# Patient Record
Sex: MALE | Race: WHITE | Employment: STUDENT | URBAN - METROPOLITAN AREA
[De-identification: names, ages, dates, MRNs, and addresses within clinical notes are randomized per-mention and may not be internally consistent; named-entity substitution may affect disease eponyms.]

---
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
## 2020-01-09 ENCOUNTER — HOSPITAL ENCOUNTER (OUTPATIENT)
Age: 20
Discharge: HOME | End: 2020-01-09
Attending: EMERGENCY MEDICINE
Payer: COMMERCIAL

## 2020-01-09 VITALS
RESPIRATION RATE: 16 BRPM | HEART RATE: 55 BPM | TEMPERATURE: 99 F | OXYGEN SATURATION: 98 % | DIASTOLIC BLOOD PRESSURE: 69 MMHG | SYSTOLIC BLOOD PRESSURE: 132 MMHG

## 2020-01-09 DIAGNOSIS — J01.90 ACUTE SINUSITIS, RECURRENCE NOT SPECIFIED, UNSPECIFIED LOCATION: ICD-10-CM

## 2020-01-09 DIAGNOSIS — J02.9 PHARYNGITIS, UNSPECIFIED ETIOLOGY: Primary | ICD-10-CM

## 2020-01-09 LAB — S PYO AG THROAT QL: NEGATIVE

## 2020-01-09 PROCEDURE — 99214 OFFICE O/P EST MOD 30 MIN: CPT

## 2020-01-09 PROCEDURE — 87430 STREP A AG IA: CPT

## 2020-01-09 PROCEDURE — 99213 OFFICE O/P EST LOW 20 MIN: CPT

## 2020-01-09 RX ORDER — AMOXICILLIN AND CLAVULANATE POTASSIUM 875; 125 MG/1; MG/1
1 TABLET, FILM COATED ORAL 2 TIMES DAILY
Qty: 20 TABLET | Refills: 0 | Status: SHIPPED | OUTPATIENT
Start: 2020-01-09 | End: 2020-01-19

## 2020-01-09 NOTE — ED INITIAL ASSESSMENT (HPI)
Reports sore throat, sinus pain and congestion for the past few days. Denies fevers. Recent strep throat exposure.

## 2020-01-09 NOTE — ED PROVIDER NOTES
Patient Seen in: [facility]      History   Patient presents with:  Sore Throat    Stated Complaint: SORE THROAT SINUS     HPI    Pt presents to the [facility] with sore throat and sinus congestion for 3 days.  Younger sibling dx effort is normal. No respiratory distress. Breath sounds: Normal breath sounds. Abdominal:      General: There is no distension. Palpations: Abdomen is soft. Tenderness: There is no tenderness. Musculoskeletal: Normal range of motion.

## 2020-07-01 ENCOUNTER — OFFICE VISIT (OUTPATIENT)
Dept: INTERNAL MEDICINE CLINIC | Facility: CLINIC | Age: 20
End: 2020-07-01
Payer: COMMERCIAL

## 2020-07-01 VITALS
HEART RATE: 48 BPM | HEIGHT: 71 IN | SYSTOLIC BLOOD PRESSURE: 110 MMHG | OXYGEN SATURATION: 99 % | BODY MASS INDEX: 27.97 KG/M2 | TEMPERATURE: 98 F | DIASTOLIC BLOOD PRESSURE: 78 MMHG | WEIGHT: 199.81 LBS

## 2020-07-01 DIAGNOSIS — Z02.5 SPORTS PHYSICAL: Primary | ICD-10-CM

## 2020-07-01 DIAGNOSIS — R00.1 SINUS BRADYCARDIA: ICD-10-CM

## 2020-07-01 PROCEDURE — 99385 PREV VISIT NEW AGE 18-39: CPT | Performed by: INTERNAL MEDICINE

## 2020-07-01 NOTE — PROGRESS NOTES
Irena Rosa is a 23year old male.     Chief complaint: school physical     HPI:     23year old male with PMH as listed below here for   School physical     No chest pain no sob no abdominal pain  No diarrhea or constipation   No fever or chills   No ur clear  HERMINIO, EOMI   NECK: supple,no adenopathy  LUNGS: clear to auscultation  CARDIO: RRR without murmur  GI: good BS's,no masses, HSM or tenderness  EXTREMITIES: no cyanosis, clubbing or edema  Genitalia : normal no hernias     NEURO: no gross deficits

## 2020-07-03 ENCOUNTER — HOSPITAL ENCOUNTER (OUTPATIENT)
Dept: CV DIAGNOSTICS | Age: 20
Discharge: HOME | End: 2020-07-03
Attending: INTERNAL MEDICINE
Payer: COMMERCIAL

## 2020-07-03 DIAGNOSIS — Z02.5 SPORTS PHYSICAL: ICD-10-CM

## 2020-07-03 DIAGNOSIS — R00.1 SINUS BRADYCARDIA: ICD-10-CM

## 2020-07-03 PROCEDURE — 93306 TTE W/DOPPLER COMPLETE: CPT | Performed by: INTERNAL MEDICINE

## 2020-07-08 ENCOUNTER — NURSE ONLY (OUTPATIENT)
Dept: INTERNAL MEDICINE CLINIC | Facility: CLINIC | Age: 20
End: 2020-07-08
Payer: COMMERCIAL

## 2020-07-08 DIAGNOSIS — R74.8 ELEVATED LIVER ENZYMES: Primary | ICD-10-CM

## 2020-07-08 DIAGNOSIS — R00.1 SINUS BRADYCARDIA: ICD-10-CM

## 2020-07-08 DIAGNOSIS — Z02.5 SPORTS PHYSICAL: ICD-10-CM

## 2020-07-08 LAB
ALBUMIN SERPL-MCNC: 3.9 G/DL (ref 3.4–5)
ALBUMIN/GLOB SERPL: 1.2 {RATIO} (ref 1–2)
ALP LIVER SERPL-CCNC: 78 U/L (ref 45–117)
ALT SERPL-CCNC: 58 U/L (ref 16–61)
ANION GAP SERPL CALC-SCNC: 7 MMOL/L (ref 0–18)
AST SERPL-CCNC: 70 U/L (ref 15–37)
BASOPHILS # BLD AUTO: 0.02 X10(3) UL (ref 0–0.2)
BASOPHILS NFR BLD AUTO: 0.5 %
BILIRUB SERPL-MCNC: 1.1 MG/DL (ref 0.1–2)
BUN BLD-MCNC: 11 MG/DL (ref 7–18)
BUN/CREAT SERPL: 10.4 (ref 10–20)
CALCIUM BLD-MCNC: 8.6 MG/DL (ref 8.5–10.1)
CHLORIDE SERPL-SCNC: 109 MMOL/L (ref 98–112)
CHOLEST SMN-MCNC: 123 MG/DL (ref ?–200)
CO2 SERPL-SCNC: 24 MMOL/L (ref 21–32)
CREAT BLD-MCNC: 1.06 MG/DL (ref 0.7–1.3)
DEPRECATED RDW RBC AUTO: 37 FL (ref 35.1–46.3)
EOSINOPHIL # BLD AUTO: 0.06 X10(3) UL (ref 0–0.7)
EOSINOPHIL NFR BLD AUTO: 1.4 %
ERYTHROCYTE [DISTWIDTH] IN BLOOD BY AUTOMATED COUNT: 11.7 % (ref 11–15)
GLOBULIN PLAS-MCNC: 3.3 G/DL (ref 2.8–4.4)
GLUCOSE BLD-MCNC: 76 MG/DL (ref 70–99)
HCT VFR BLD AUTO: 44.5 % (ref 39–53)
HDLC SERPL-MCNC: 45 MG/DL (ref 40–59)
HGB BLD-MCNC: 16.3 G/DL (ref 13–17.5)
IMM GRANULOCYTES # BLD AUTO: 0.01 X10(3) UL (ref 0–1)
IMM GRANULOCYTES NFR BLD: 0.2 %
LDLC SERPL CALC-MCNC: 69 MG/DL (ref ?–100)
LYMPHOCYTES # BLD AUTO: 1.53 X10(3) UL (ref 1.5–5)
LYMPHOCYTES NFR BLD AUTO: 34.5 %
M PROTEIN MFR SERPL ELPH: 7.2 G/DL (ref 6.4–8.2)
MCH RBC QN AUTO: 31.8 PG (ref 26–34)
MCHC RBC AUTO-ENTMCNC: 36.6 G/DL (ref 31–37)
MCV RBC AUTO: 86.7 FL (ref 80–100)
MONOCYTES # BLD AUTO: 0.55 X10(3) UL (ref 0.1–1)
MONOCYTES NFR BLD AUTO: 12.4 %
NEUTROPHILS # BLD AUTO: 2.27 X10 (3) UL (ref 1.5–7.7)
NEUTROPHILS # BLD AUTO: 2.27 X10(3) UL (ref 1.5–7.7)
NEUTROPHILS NFR BLD AUTO: 51 %
NONHDLC SERPL-MCNC: 78 MG/DL (ref ?–130)
OSMOLALITY SERPL CALC.SUM OF ELEC: 288 MOSM/KG (ref 275–295)
PATIENT FASTING Y/N/NP: YES
PATIENT FASTING Y/N/NP: YES
PLATELET # BLD AUTO: 174 10(3)UL (ref 150–450)
POTASSIUM SERPL-SCNC: 3.9 MMOL/L (ref 3.5–5.1)
RBC # BLD AUTO: 5.13 X10(6)UL (ref 4.3–5.7)
SODIUM SERPL-SCNC: 140 MMOL/L (ref 136–145)
TRIGL SERPL-MCNC: 47 MG/DL (ref 30–149)
TSI SER-ACNC: 1.31 MIU/ML (ref 0.36–3.74)
VLDLC SERPL CALC-MCNC: 9 MG/DL (ref 0–30)
WBC # BLD AUTO: 4.4 X10(3) UL (ref 4–11)

## 2020-07-08 PROCEDURE — 80061 LIPID PANEL: CPT | Performed by: INTERNAL MEDICINE

## 2020-07-08 PROCEDURE — 36415 COLL VENOUS BLD VENIPUNCTURE: CPT | Performed by: INTERNAL MEDICINE

## 2020-07-08 PROCEDURE — 80050 GENERAL HEALTH PANEL: CPT | Performed by: INTERNAL MEDICINE

## 2020-07-08 NOTE — PROGRESS NOTES
Patient came in today to get labs done, per Dr Laryr Gregory have patient get CBC,CMP, LIPIDS AND TSH. Blood was drawn from his R antecubital with no complications and specimen sent to the lab.

## 2020-08-09 ENCOUNTER — HOSPITAL ENCOUNTER (EMERGENCY)
Facility: HOSPITAL | Age: 20
Discharge: HOME | End: 2020-08-09
Payer: COMMERCIAL

## 2020-08-09 VITALS
HEIGHT: 72 IN | BODY MASS INDEX: 26.28 KG/M2 | HEART RATE: 70 BPM | DIASTOLIC BLOOD PRESSURE: 62 MMHG | RESPIRATION RATE: 18 BRPM | TEMPERATURE: 99 F | OXYGEN SATURATION: 97 % | SYSTOLIC BLOOD PRESSURE: 117 MMHG | WEIGHT: 194 LBS

## 2020-08-09 DIAGNOSIS — B34.9 VIRAL ILLNESS: Primary | ICD-10-CM

## 2020-08-09 LAB — S PYO AG THROAT QL: NEGATIVE

## 2020-08-09 PROCEDURE — 87430 STREP A AG IA: CPT

## 2020-08-09 PROCEDURE — 99283 EMERGENCY DEPT VISIT LOW MDM: CPT

## 2020-08-09 NOTE — ED NOTES
Patient reports cough, sore throat, fever and fatigue for 2 days. No known sick contacts. States he does not work out of the home, has been social distancing and wearing a mask as much as possible. No sick family members at home. Dad at the bedside.     Garth Nice

## 2020-08-09 NOTE — ED INITIAL ASSESSMENT (HPI)
Sore throat, cough, fever (tmax 101.8) and fatigue x 2 days. No known covid contacts.      Wants testing

## 2020-08-09 NOTE — TELEPHONE ENCOUNTER
Mother called stated that Tania Burr had sudden onset of high fever of 101.5 F. Body, ache, frequent cough, very weak, poor appetite. P : High suspicion  for Covid. Will go to [facility] ED now.

## 2020-08-09 NOTE — ED PROVIDER NOTES
Patient Seen in: [facility] AND [facility] Emergency Department      History   No chief complaint on file. Stated Complaint: Testing    HPI    66-year-old male presents to the emergency department with sore throat cough and low-grade fever times last 2 days. Musculoskeletal:         General: No tenderness. Skin:     General: Skin is warm and dry. Capillary Refill: Capillary refill takes less than 2 seconds. Findings: No rash.    Neurological:      Mental Status: He is alert and oriented to person,

## 2020-08-15 LAB — SARS-COV-2 BY PCR: DETECTED

## 2021-09-07 ENCOUNTER — HOSPITAL ENCOUNTER (OUTPATIENT)
Age: 21
Discharge: HOME | End: 2021-09-07
Payer: COMMERCIAL

## 2021-09-07 VITALS
SYSTOLIC BLOOD PRESSURE: 125 MMHG | BODY MASS INDEX: 27 KG/M2 | HEART RATE: 74 BPM | TEMPERATURE: 98 F | DIASTOLIC BLOOD PRESSURE: 82 MMHG | RESPIRATION RATE: 20 BRPM | WEIGHT: 196.38 LBS | OXYGEN SATURATION: 97 %

## 2021-09-07 DIAGNOSIS — J02.0 STREPTOCOCCAL SORE THROAT: Primary | ICD-10-CM

## 2021-09-07 LAB — S PYO AG THROAT QL: POSITIVE

## 2021-09-07 PROCEDURE — 87880 STREP A ASSAY W/OPTIC: CPT

## 2021-09-07 PROCEDURE — 99213 OFFICE O/P EST LOW 20 MIN: CPT

## 2021-09-07 RX ORDER — AMOXICILLIN 875 MG/1
875 TABLET, COATED ORAL 2 TIMES DAILY
Qty: 20 TABLET | Refills: 0 | Status: SHIPPED | OUTPATIENT
Start: 2021-09-07 | End: 2021-09-17

## 2021-09-07 NOTE — ED PROVIDER NOTES
Patient presents with:  Sore Throat      HPI:     Chrystal Mejias is a 21year old male who presents for a sore throat and tactile fever for the past 2 to 3 days. His siblings have the same symptoms. No difficulty swallowing. Speech is clear.   No difficu About Running Out of Food in the Last Year:       Ran Out of Food in the Last Year:   Transportation Needs:       Lack of Transportation (Medical):       Lack of Transportation (Non-Medical):   Physical Activity:       Days of Exercise per Week:       Mary Strep    Collection Time: 09/07/21 10:05 AM   Result Value Ref Range    POCT Rapid Strep Positive (A) Negative       MDM:  The rapid strep test is positive. I will treat with amoxicillin. He declines Covid testing.   He will follow-up as needed with his p

## 2021-09-28 NOTE — PROGRESS NOTES
FAMILY MEDICINE CLINIC NOTE    HPI  Russ Caal is a 21year old male presenting for physical    #Health Maintenance  -Diet: Good - fruits, vegetables, watches what he eats  -Exercise: Basketball regularly - plays for COD  -Lung cancer screen: Not indic status: Single      Spouse name: Not on file      Number of children: Not on file      Years of education: Not on file      Highest education level: Not on file    Occupational History      Not on file    Tobacco Use      Smoking status: Never Smoker Services: Not on file      Active Member of Clubs or Organizations: Not on file      Attends Club or Organization Meetings: Not on file      Marital Status: Not on file  Intimate Partner Violence:       Fear of Current or Ex-Partner: Not on file      Emoti range of motion of bilateral upper and lower extremities. 5 5 strength flexion extension of the bilateral upper and lower extremities. Normal duck walk, 1 foot hop.   SKIN: Warm and dry, no rashes  NEURO: Answering questions appropriately    LABS  Lab Res from a heart attack in his older age. His bradycardia could be related to his athletic build. We will continue to monitor this bradycardia over time. Other    Health maintenance examination - Primary     Exercise and diet advised.   No blood w

## 2021-09-29 ENCOUNTER — OFFICE VISIT (OUTPATIENT)
Dept: INTERNAL MEDICINE CLINIC | Facility: CLINIC | Age: 21
End: 2021-09-29
Payer: COMMERCIAL

## 2021-09-29 VITALS
BODY MASS INDEX: 26.9 KG/M2 | SYSTOLIC BLOOD PRESSURE: 110 MMHG | WEIGHT: 198.63 LBS | OXYGEN SATURATION: 99 % | HEART RATE: 42 BPM | DIASTOLIC BLOOD PRESSURE: 70 MMHG | HEIGHT: 72 IN

## 2021-09-29 DIAGNOSIS — R74.8 ELEVATED LIVER ENZYMES: ICD-10-CM

## 2021-09-29 DIAGNOSIS — R00.1 BRADYCARDIA: ICD-10-CM

## 2021-09-29 DIAGNOSIS — Z00.00 HEALTH MAINTENANCE EXAMINATION: Primary | ICD-10-CM

## 2021-09-29 PROBLEM — S93.411A SPRAIN OF CALCANEOFIBULAR LIGAMENT OF RIGHT ANKLE: Status: RESOLVED | Noted: 2017-04-24 | Resolved: 2021-09-29

## 2021-09-29 PROBLEM — J03.91 RECURRENT ACUTE TONSILLITIS: Status: RESOLVED | Noted: 2019-04-08 | Resolved: 2021-09-29

## 2021-09-29 PROBLEM — S93.402A SPRAIN OF LEFT ANKLE: Status: RESOLVED | Noted: 2018-03-08 | Resolved: 2021-09-29

## 2021-09-29 PROBLEM — J03.91 RECURRENT ACUTE TONSILLITIS: Status: ACTIVE | Noted: 2019-04-08

## 2021-09-29 PROCEDURE — 3078F DIAST BP <80 MM HG: CPT | Performed by: FAMILY MEDICINE

## 2021-09-29 PROCEDURE — 3008F BODY MASS INDEX DOCD: CPT | Performed by: FAMILY MEDICINE

## 2021-09-29 PROCEDURE — 99395 PREV VISIT EST AGE 18-39: CPT | Performed by: FAMILY MEDICINE

## 2021-09-29 PROCEDURE — 96127 BRIEF EMOTIONAL/BEHAV ASSMT: CPT | Performed by: FAMILY MEDICINE

## 2021-09-29 PROCEDURE — 3074F SYST BP LT 130 MM HG: CPT | Performed by: FAMILY MEDICINE

## 2021-09-29 NOTE — PATIENT INSTRUCTIONS
• Thank you for seeing me today, it was a pleasure taking care of you. • Please check out at the  and schedule a follow up appointment. Return in about 1 year (around 9/29/2022). • Please make a lab appointment to get your blood drawn.   • The

## 2021-09-29 NOTE — ASSESSMENT & PLAN NOTE
Exercise and diet advised. No blood work needed today–recently had blood work 1 year ago. In the next blood draw, would recommend HIV screen and hepatitis C screen. Flu shot declined  HPV vaccine declined  Advanced directive information provided.   Kathie Thornton

## 2021-09-29 NOTE — ASSESSMENT & PLAN NOTE
Chart review patient does have mildly elevated liver enzymes in the past.  Can continue to monitor with future blood work

## 2021-09-29 NOTE — ASSESSMENT & PLAN NOTE
Patient with significant bradycardia, here for sports physical.  He has been evaluated for his bradycardia in the past.  He has had an EKG last year which showed sinus bradycardia in addition to an echo completed which showed wall thickness of the left jacque

## 2022-04-13 ENCOUNTER — APPOINTMENT (OUTPATIENT)
Dept: GENERAL RADIOLOGY | Age: 22
End: 2022-04-13
Attending: EMERGENCY MEDICINE
Payer: COMMERCIAL

## 2022-04-13 ENCOUNTER — HOSPITAL ENCOUNTER (OUTPATIENT)
Age: 22
Discharge: HOME | End: 2022-04-13
Payer: COMMERCIAL

## 2022-04-13 VITALS
TEMPERATURE: 98 F | OXYGEN SATURATION: 97 % | HEART RATE: 57 BPM | DIASTOLIC BLOOD PRESSURE: 84 MMHG | RESPIRATION RATE: 18 BRPM | SYSTOLIC BLOOD PRESSURE: 142 MMHG

## 2022-04-13 DIAGNOSIS — S63.641A SPRAIN OF METACARPOPHALANGEAL (MCP) JOINT OF RIGHT THUMB, INITIAL ENCOUNTER: Primary | ICD-10-CM

## 2022-04-13 PROCEDURE — 73140 X-RAY EXAM OF FINGER(S): CPT | Performed by: EMERGENCY MEDICINE

## 2022-04-13 PROCEDURE — 99213 OFFICE O/P EST LOW 20 MIN: CPT

## 2022-04-13 RX ORDER — IBUPROFEN 600 MG/1
600 TABLET ORAL EVERY 6 HOURS PRN
Qty: 20 TABLET | Refills: 0 | Status: SHIPPED | OUTPATIENT
Start: 2022-04-13

## 2022-04-13 NOTE — ED INITIAL ASSESSMENT (HPI)
PATIENT ARRIVED AMBULATORY TO ROOM C/O PAIN TO THE RIGHT THUMB.  PATIENT STATES HE INJURED HIS THUMB PLAYING BASKETBALL YESTERDAY

## 2022-05-30 ENCOUNTER — HOSPITAL ENCOUNTER (OUTPATIENT)
Age: 22
Discharge: HOME | End: 2022-05-30
Payer: COMMERCIAL

## 2022-05-30 VITALS
RESPIRATION RATE: 16 BRPM | OXYGEN SATURATION: 100 % | TEMPERATURE: 98 F | DIASTOLIC BLOOD PRESSURE: 80 MMHG | SYSTOLIC BLOOD PRESSURE: 144 MMHG | HEART RATE: 58 BPM

## 2022-05-30 DIAGNOSIS — S50.312A ABRASION OF LEFT ELBOW, INITIAL ENCOUNTER: Primary | ICD-10-CM

## 2022-05-30 PROCEDURE — 99213 OFFICE O/P EST LOW 20 MIN: CPT

## 2022-05-30 PROCEDURE — 90471 IMMUNIZATION ADMIN: CPT

## 2023-12-22 ENCOUNTER — TELEPHONE (OUTPATIENT)
Dept: PEDIATRICS CLINIC | Facility: CLINIC | Age: 23
End: 2023-12-22

## (undated) NOTE — MR AVS SNAPSHOT
Elise  [address]  [phone]               Thank you for choosing us for your health care visit with Vern Ferrara MD.  We are glad to serve you and happy to provide you with this summ often. Sometimes toddlers need to try a food 10 times before they actually accept and enjoy it. It is also important to encourage play time as soon as they start crawling and walking.  As your children grow, continue to help them live a healthy active lifes

## (undated) NOTE — LETTER
Name:  Merlin Mate Year:  12th Grade Class: Student ID No.:   [place] [id number] Phone:  [phone] (home)  : 89/ 16year old   Name Relationship [address] Work Phone Home Phone Mobile Phone   1.  Ronnie Never 15. Does anyone in your family have hypertrophic cardiomyopathy, Marfan syndrome, arrhythmogenic right ventricular cardiomyopathy, long QT syndrome, short QT syndrome, Brugada syndrome, or catecholaminergic polymorphic ventricular tachycardia?      15. Does 35. Have you ever had a hit or blow to the head that caused confusion, prolonged headache, or memory problems? 36. Do you have a history of seizure disorder?     37. Do you have headaches with exercise?      38. Have you ever had numbness, tingling, or Appearance:  Marfan stigmata (kyphoscoliosis, high-arched palate, pectus excavatum,      arachnodactyly, arm span > height, hyperlaxity, myopia, MVP, aortic insufficiency) Yes    Eyes/Ears/Nose/Throat:  Pupils equal    Hearing Yes    Lymph nodes Yes    Hea As a prerequisite to participation in [email or web address] athletic activities, we agree that I/our student will not use performance-enhancing substances as defined in the [facility] Performance-Enhancing Substance Testing Program Protocol.  We have reviewed the policy and under

## (undated) NOTE — LETTER
3/23/2018          To Whom It May Concern:    Idalia Chairez is currently under my medical care. Due to a recent ankle injury occurring during basketball Amrita Todd was unable to participate in gym or physical activities. Injury from 3-8-18.     Patient will

## (undated) NOTE — LETTER
VACCINE ADMINISTRATION RECORD  PARENT / GUARDIAN APPROVAL  Date: 7/3/2019  Vaccine administered to: Lauren Le     : 10/26/2000    MRN: [id number]    A copy of the appropriate Centers for Disease Control and Prevention Vaccine Information statement

## (undated) NOTE — Clinical Note
April 24, 2017      To whom it may concern: This is to certify that Gaby Vasquez, YOB: 2000:     Other: wear ankle brace for 6 weeks, no gym for 3 weeks then run and jump at own pace for rest of school year    The patient was seen o

## (undated) NOTE — LETTER
3/15/2019              [facility] [place] [place] [address]         To whom it may concern,    I saw Irena Keepers in my office today. Please excuse Irena Keepers from school on 3/15/2019. Can return when fever free.

## (undated) NOTE — ED AVS SNAPSHOT
Hueymatt Jin   MRN: [id number]    Department:  [facility] Emergency Department   Date of Visit:  6/9/2018           Disclosure     Insurance plans vary and the physician(s) referred by the ER may not be covered by your plan.  Please contact your tell this physician (or your personal doctor if your instructions are to return to your personal doctor) about any new or lasting problems. The primary care or specialist physician will see patients referred from the [facility] Emergency Department.  Sree Lopez

## (undated) NOTE — LETTER
11/28/2017                                                                                   Regarding:        [address] [place]        [address]         To Whom it may concern:     This is to certify that Eugenie Yeboah

## (undated) NOTE — LETTER
2018              Maldonado Males         : 10/26/2000        [address]         To Whom it may concern: This is to certify that Maldonado Plascencia had an appointment on 2018 at 8:30 AM with Osmel Mtz.  Barry,

## (undated) NOTE — LETTER
State of [facility] [address] of Child Health Examination       Student's Name  Dana Wu Menchaca Da Title                           Date  7/3/2019   Signature HEALTH HISTORY          TO BE COMPLETED AND SIGNED BY PARENT/GUARDIAN AND VERIFIED BY HEALTH CARE PROVIDER    ALLERGIES  (Food, drug, insect, other)  Patient has no known allergies.  MEDICATION  (List all prescribed or taken on a regular basis.)  No current /75   Pulse (!) 48   Ht 5' 11\" (1.803 m)   Wt 88.5 kg (195 lb 2.4 oz)   BMI 27.22 kg/m²     DIABETES SCREENING  BMI>85% age/sex  No And any two of the following:  Family History Yes    Ethnic Minority  No          Signs of Insulin Resistance (hypert Yes        Currently Prescribed Asthma Medication:            Quick-relief  medication (e.g. Short Acting Beta Antagonist): No          Controller medication (e.g. inhaled corticosteroid):   No Other   NEEDS/MODIFICATIONS required in the school setting  No

## (undated) NOTE — LETTER
VACCINE ADMINISTRATION RECORD  PARENT / GUARDIAN APPROVAL  Date: 2018  Vaccine administered to: Zuhair Self     : 10/26/2000    MRN: [id number]    A copy of the appropriate Centers for Disease Control and Prevention Vaccine Information statement

## (undated) NOTE — MR AVS SNAPSHOT
[address]  [address]  [phone]               Thank you for choosing us for your health care visit with Isela Chand DPM.  We are glad to serve you and happy to provide yo Medical Issues Discussed Today     Acute right ankle pain    -  Primary    Sprain of right ankle, unspecified ligament, initial encounter          Instructions and Information about Your Health     None      Allergies as of Jun 14, 2017     No Known Allerg

## (undated) NOTE — LETTER
[facility] [place] [address] [address]  Dept: [phone]  Dept Fax: [phone] 49: [phone]      February 21, 2018    Patient: Sakshi Lopez   Date of Visit: 2/21/2018       To Whom It May Concern:    Marylou Antony

## (undated) NOTE — LETTER
Date & Time: 2/13/2019, 10:32 AM  Patient: Jaun Nona [id number]  Encounter Provider(s):    AICHA Duggan       To Whom It May Concern:    Shaniqua Carty was seen and treated in our department on 2/13/2019. Please excuse from school today.   If you have any

## (undated) NOTE — ED AVS SNAPSHOT
[facility] Emergency Department    [place] [address]     [place] [address]    Phone:  [phone]    Fax:  [phone]   MRN: [id number]    Department:  [facility] Emergency Department   Date of Visit:  4/23/ indicado, llame al encargado de maco al [phone]. It is our goal to assure that you are completely satisfied with every aspect of your visit today.   In an effort to constantly improve our service to you, we would appreciate any positive or negativ Any imaging studies and labs completed today can be reviewed in your MyChart account. You may have had testing done that requires us to contact you. Please make sure we have your correct phone number on file.       I certified that I have received a copy [facility] access allows you to view health information for your child from their recent   visit, view other health information and more. To sign up or find more information on getting   Proxy Access to your child’s [place] go to [email or web address]

## (undated) NOTE — LETTER
[facility] [address] [facility] of Child Health Examination       Student's Name  Sylwia Menchaca Da Title                           Date     Signature                                                                                                                                              Title PARENT/GUARDIAN AND VERIFIED BY HEALTH CARE PROVIDER    ALLERGIES  (Food, drug, insect, other)  Patient has no known allergies. MEDICATION  (List all prescribed or taken on a regular basis.)  No current outpatient medications on file.    Diagnosis of asthma BMI>85% age/sex   And any two of the following:  Family History     Ethnic Minority            Signs of Insulin Resistance (hypertension, dyslipidemia, polycystic ovarian syndrome, acanthosis nigricans)               At Risk     Lead Risk Questionnaire  Re Medication:            Quick-relief  medication (e.g. Short Acting Beta Antagonist): {NO:830::\"No\"}          Controller medication (e.g. inhaled corticosteroid):   {NO:830::\"No\"} Other   NEEDS/MODIFICATIONS required in the school setting  {DMG_NONE:136

## (undated) NOTE — LETTER
Date & Time: 8/9/2020, 4:38 PM  Patient: Chris Lr  Encounter Provider(s):    Emeline Denver, APRN       To Whom It May Concern:    Nick Hooper was seen and treated in our department on 8/9/2020.  He should not return to work until return of

## (undated) NOTE — LETTER
[facility] [place]  [address] [facility] [address]  Dept: [phone]  Dept Fax: [phone]  Loc: [phone]      November 1, 2017    Patient: Maldonado Plascencia   Date of Visit: 11/1/2017       To Whom It May Concern:    Joy Davis

## (undated) NOTE — LETTER
Demographics      Pb Bertrand   [id number]   Male   10/26/2000 (23 year old)     Allergies  Review status set to In Progress on 5/30/2022  No Known Allergies  Current Immunizations  Reviewed on 9/29/2021  Name Date   DTAP-DAPTACEL 8/2/2006 , 12/17/2001 , 5/14/2001 , 3/19/2001 , 1/22/2001   DTAP-INFANRIX 8/2/2006 , 12/17/2001 , 5/14/2001 , 3/19/2001 , 1/22/2001   HEP A 9/12/2018 , 7/10/2015   HEP B 12/17/2001 , 3/19/2001 , 1/22/2001   HEP B/HIB 12/17/2001 , 3/19/2001 , 1/22/2001   HIB 12/17/2001 , 3/19/2001 , 1/22/2001   INFLUENZA 1/19/2014   IPV 8/2/2006 , 5/14/2001 , 3/19/2001 , 1/22/2001   MENINGOCOCCAL B (Bexsero) 7/3/2019   MMR 8/2/2006 , 8/2/2006 , 12/17/2001 , 12/17/2001   Meningococcal-Menactra 9/12/2018 , 7/10/2015   Meningococcal-Menveo 7/3/2019   Pneumococcal (Prevnar 7) 12/17/2001 , 5/14/2001 , 3/7/2001   TDAP 5/30/2022 , 8/10/2012   VARICELLA 7/10/2015 , 8/9/2004

## (undated) NOTE — ED AVS SNAPSHOT
[facility] Emergency Department    [address]     [place] [address]    Phone:  [phone]    Fax:  [phone]   MRN: [id number]    Department:  [facility] Emergency Department   Date of Visit:  4/23/ and Class Registration line at [phone] or find a doctor online by visiting [email or web address].    IF THERE IS ANY CHANGE OR WORSENING OF YOUR CONDITION, CALL YOUR PRIMARY CARE PHYSICIAN AT ONCE OR RETURN IMMEDIATELY TO [address] [place] [address].     If